# Patient Record
Sex: FEMALE | Race: BLACK OR AFRICAN AMERICAN | NOT HISPANIC OR LATINO | URBAN - METROPOLITAN AREA
[De-identification: names, ages, dates, MRNs, and addresses within clinical notes are randomized per-mention and may not be internally consistent; named-entity substitution may affect disease eponyms.]

---

## 2019-03-22 ENCOUNTER — INPATIENT (INPATIENT)
Facility: HOSPITAL | Age: 69
LOS: 0 days | Discharge: HOME | DRG: 311 | End: 2019-03-23
Attending: INTERNAL MEDICINE | Admitting: INTERNAL MEDICINE
Payer: MEDICAID

## 2019-03-22 VITALS
TEMPERATURE: 98 F | SYSTOLIC BLOOD PRESSURE: 163 MMHG | DIASTOLIC BLOOD PRESSURE: 91 MMHG | RESPIRATION RATE: 18 BRPM | HEART RATE: 72 BPM | WEIGHT: 179.9 LBS | HEIGHT: 66 IN | OXYGEN SATURATION: 96 %

## 2019-03-22 DIAGNOSIS — I20.0 UNSTABLE ANGINA: ICD-10-CM

## 2019-03-22 LAB
ALBUMIN SERPL ELPH-MCNC: 4.3 G/DL — SIGNIFICANT CHANGE UP (ref 3.3–5)
ALP SERPL-CCNC: 126 U/L — HIGH (ref 40–120)
ALT FLD-CCNC: 37 U/L — SIGNIFICANT CHANGE UP (ref 10–45)
ANION GAP SERPL CALC-SCNC: 13 MMOL/L — SIGNIFICANT CHANGE UP (ref 5–17)
APTT BLD: 30.3 SEC — SIGNIFICANT CHANGE UP (ref 27.5–36.3)
APTT BLD: 71.4 SEC — HIGH (ref 27.5–36.3)
AST SERPL-CCNC: 23 U/L — SIGNIFICANT CHANGE UP (ref 10–40)
BASOPHILS # BLD AUTO: 0 K/UL — SIGNIFICANT CHANGE UP (ref 0–0.2)
BASOPHILS NFR BLD AUTO: 0.1 % — SIGNIFICANT CHANGE UP (ref 0–2)
BILIRUB SERPL-MCNC: 0.2 MG/DL — SIGNIFICANT CHANGE UP (ref 0.2–1.2)
BUN SERPL-MCNC: 18 MG/DL — SIGNIFICANT CHANGE UP (ref 7–23)
CALCIUM SERPL-MCNC: 9.9 MG/DL — SIGNIFICANT CHANGE UP (ref 8.4–10.5)
CHLORIDE SERPL-SCNC: 101 MMOL/L — SIGNIFICANT CHANGE UP (ref 96–108)
CK MB BLD-MCNC: 3.4 % — SIGNIFICANT CHANGE UP (ref 0–3.5)
CK MB CFR SERPL CALC: 10.4 NG/ML — HIGH (ref 0–3.8)
CK SERPL-CCNC: 302 U/L — HIGH (ref 25–170)
CO2 SERPL-SCNC: 25 MMOL/L — SIGNIFICANT CHANGE UP (ref 22–31)
CREAT SERPL-MCNC: 0.85 MG/DL — SIGNIFICANT CHANGE UP (ref 0.5–1.3)
EOSINOPHIL # BLD AUTO: 0.2 K/UL — SIGNIFICANT CHANGE UP (ref 0–0.5)
EOSINOPHIL NFR BLD AUTO: 1.4 % — SIGNIFICANT CHANGE UP (ref 0–6)
GAS PNL BLDV: SIGNIFICANT CHANGE UP
GLUCOSE SERPL-MCNC: 124 MG/DL — HIGH (ref 70–99)
HCT VFR BLD CALC: 34.7 % — SIGNIFICANT CHANGE UP (ref 34.5–45)
HCT VFR BLD CALC: 35.8 % — SIGNIFICANT CHANGE UP (ref 34.5–45)
HGB BLD-MCNC: 11.5 G/DL — SIGNIFICANT CHANGE UP (ref 11.5–15.5)
HGB BLD-MCNC: 12 G/DL — SIGNIFICANT CHANGE UP (ref 11.5–15.5)
INR BLD: 1.02 RATIO — SIGNIFICANT CHANGE UP (ref 0.88–1.16)
LYMPHOCYTES # BLD AUTO: 2.6 K/UL — SIGNIFICANT CHANGE UP (ref 1–3.3)
LYMPHOCYTES # BLD AUTO: 23.3 % — SIGNIFICANT CHANGE UP (ref 13–44)
MAGNESIUM SERPL-MCNC: 2.3 MG/DL — SIGNIFICANT CHANGE UP (ref 1.6–2.6)
MCHC RBC-ENTMCNC: 26.9 PG — LOW (ref 27–34)
MCHC RBC-ENTMCNC: 27.5 PG — SIGNIFICANT CHANGE UP (ref 27–34)
MCHC RBC-ENTMCNC: 33 GM/DL — SIGNIFICANT CHANGE UP (ref 32–36)
MCHC RBC-ENTMCNC: 33.6 GM/DL — SIGNIFICANT CHANGE UP (ref 32–36)
MCV RBC AUTO: 81.4 FL — SIGNIFICANT CHANGE UP (ref 80–100)
MCV RBC AUTO: 81.9 FL — SIGNIFICANT CHANGE UP (ref 80–100)
MONOCYTES # BLD AUTO: 0.6 K/UL — SIGNIFICANT CHANGE UP (ref 0–0.9)
MONOCYTES NFR BLD AUTO: 4.9 % — SIGNIFICANT CHANGE UP (ref 2–14)
NEUTROPHILS # BLD AUTO: 7.9 K/UL — HIGH (ref 1.8–7.4)
NEUTROPHILS NFR BLD AUTO: 70.3 % — SIGNIFICANT CHANGE UP (ref 43–77)
NT-PROBNP SERPL-SCNC: 269 PG/ML — SIGNIFICANT CHANGE UP (ref 0–300)
PLATELET # BLD AUTO: 207 K/UL — SIGNIFICANT CHANGE UP (ref 150–400)
PLATELET # BLD AUTO: 222 K/UL — SIGNIFICANT CHANGE UP (ref 150–400)
POTASSIUM SERPL-MCNC: 4.2 MMOL/L — SIGNIFICANT CHANGE UP (ref 3.5–5.3)
POTASSIUM SERPL-SCNC: 4.2 MMOL/L — SIGNIFICANT CHANGE UP (ref 3.5–5.3)
PROT SERPL-MCNC: 8.1 G/DL — SIGNIFICANT CHANGE UP (ref 6–8.3)
PROTHROM AB SERPL-ACNC: 11.7 SEC — SIGNIFICANT CHANGE UP (ref 10–12.9)
RBC # BLD: 4.26 M/UL — SIGNIFICANT CHANGE UP (ref 3.8–5.2)
RBC # BLD: 4.37 M/UL — SIGNIFICANT CHANGE UP (ref 3.8–5.2)
RBC # FLD: 13.2 % — SIGNIFICANT CHANGE UP (ref 10.3–14.5)
RBC # FLD: 13.5 % — SIGNIFICANT CHANGE UP (ref 10.3–14.5)
SODIUM SERPL-SCNC: 139 MMOL/L — SIGNIFICANT CHANGE UP (ref 135–145)
TROPONIN T, HIGH SENSITIVITY RESULT: 29 NG/L — SIGNIFICANT CHANGE UP (ref 0–51)
WBC # BLD: 10.7 K/UL — HIGH (ref 3.8–10.5)
WBC # BLD: 11.3 K/UL — HIGH (ref 3.8–10.5)
WBC # FLD AUTO: 10.7 K/UL — HIGH (ref 3.8–10.5)
WBC # FLD AUTO: 11.3 K/UL — HIGH (ref 3.8–10.5)

## 2019-03-22 PROCEDURE — 78452 HT MUSCLE IMAGE SPECT MULT: CPT | Mod: 26

## 2019-03-22 PROCEDURE — 71045 X-RAY EXAM CHEST 1 VIEW: CPT | Mod: 26

## 2019-03-22 PROCEDURE — 93018 CV STRESS TEST I&R ONLY: CPT

## 2019-03-22 PROCEDURE — 99255 IP/OBS CONSLTJ NEW/EST HI 80: CPT | Mod: GC

## 2019-03-22 PROCEDURE — 99285 EMERGENCY DEPT VISIT HI MDM: CPT | Mod: 25

## 2019-03-22 PROCEDURE — 93010 ELECTROCARDIOGRAM REPORT: CPT

## 2019-03-22 PROCEDURE — 93016 CV STRESS TEST SUPVJ ONLY: CPT

## 2019-03-22 RX ORDER — ATENOLOL 25 MG/1
50 TABLET ORAL DAILY
Qty: 0 | Refills: 0 | Status: DISCONTINUED | OUTPATIENT
Start: 2019-03-22 | End: 2019-03-23

## 2019-03-22 RX ORDER — LISINOPRIL 2.5 MG/1
5 TABLET ORAL ONCE
Qty: 0 | Refills: 0 | Status: DISCONTINUED | OUTPATIENT
Start: 2019-03-22 | End: 2019-03-22

## 2019-03-22 RX ORDER — ATENOLOL 25 MG/1
0.5 TABLET ORAL
Qty: 0 | Refills: 0 | COMMUNITY

## 2019-03-22 RX ORDER — LISINOPRIL 2.5 MG/1
10 TABLET ORAL ONCE
Qty: 0 | Refills: 0 | Status: COMPLETED | OUTPATIENT
Start: 2019-03-22 | End: 2019-03-22

## 2019-03-22 RX ORDER — ASPIRIN/CALCIUM CARB/MAGNESIUM 324 MG
1 TABLET ORAL
Qty: 0 | Refills: 0 | COMMUNITY

## 2019-03-22 RX ORDER — FUROSEMIDE 40 MG
1 TABLET ORAL
Qty: 0 | Refills: 0 | COMMUNITY

## 2019-03-22 RX ORDER — FUROSEMIDE 40 MG
40 TABLET ORAL
Qty: 0 | Refills: 0 | Status: DISCONTINUED | OUTPATIENT
Start: 2019-03-22 | End: 2019-03-23

## 2019-03-22 RX ORDER — GABAPENTIN 400 MG/1
300 CAPSULE ORAL
Qty: 0 | Refills: 0 | Status: DISCONTINUED | OUTPATIENT
Start: 2019-03-22 | End: 2019-03-23

## 2019-03-22 RX ORDER — GABAPENTIN 400 MG/1
1 CAPSULE ORAL
Qty: 0 | Refills: 0 | COMMUNITY

## 2019-03-22 RX ORDER — HEPARIN SODIUM 5000 [USP'U]/ML
5000 INJECTION INTRAVENOUS; SUBCUTANEOUS EVERY 6 HOURS
Qty: 0 | Refills: 0 | Status: DISCONTINUED | OUTPATIENT
Start: 2019-03-22 | End: 2019-03-22

## 2019-03-22 RX ORDER — LOSARTAN/HYDROCHLOROTHIAZIDE 100MG-25MG
1 TABLET ORAL
Qty: 0 | Refills: 0 | COMMUNITY

## 2019-03-22 RX ORDER — ASPIRIN/CALCIUM CARB/MAGNESIUM 324 MG
81 TABLET ORAL DAILY
Qty: 0 | Refills: 0 | Status: DISCONTINUED | OUTPATIENT
Start: 2019-03-22 | End: 2019-03-23

## 2019-03-22 RX ORDER — HEPARIN SODIUM 5000 [USP'U]/ML
INJECTION INTRAVENOUS; SUBCUTANEOUS
Qty: 25000 | Refills: 0 | Status: DISCONTINUED | OUTPATIENT
Start: 2019-03-22 | End: 2019-03-22

## 2019-03-22 RX ADMIN — Medication 40 MILLIGRAM(S): at 19:05

## 2019-03-22 RX ADMIN — GABAPENTIN 300 MILLIGRAM(S): 400 CAPSULE ORAL at 19:05

## 2019-03-22 RX ADMIN — LISINOPRIL 10 MILLIGRAM(S): 2.5 TABLET ORAL at 10:39

## 2019-03-22 RX ADMIN — HEPARIN SODIUM 1000 UNIT(S)/HR: 5000 INJECTION INTRAVENOUS; SUBCUTANEOUS at 09:56

## 2019-03-22 NOTE — CONSULT NOTE ADULT - SUBJECTIVE AND OBJECTIVE BOX
Patient seen and evaluated at bedside    Chief Complaint: chest pain    HPI:      PMHx:   HTN  HLD      PSHx:   No significant past surgical history      Allergies:  penicillin (Hives; Rash)      Home Meds:    Current Medications:   heparin  Infusion.  Unit(s)/Hr IV Continuous <Continuous>  heparin  Injectable 5000 Unit(s) IV Push every 6 hours PRN      FAMILY HISTORY:      Social History:  Smoking History: denies  Alcohol Use: denies  Drug Use: denies    REVIEW OF SYSTEMS:  CONSTITUTIONAL: No weakness, fevers or chills  EYES/ENT: No visual changes;  No dysphagia  NECK: No pain or stiffness  RESPIRATORY: No cough, wheezing, hemoptysis; No shortness of breath  CARDIOVASCULAR: No chest pain or palpitations; No lower extremity edema  GASTROINTESTINAL: No abdominal or epigastric pain. No nausea, vomiting, or hematemesis; No diarrhea or constipation. No melena or hematochezia.  BACK: No back pain  GENITOURINARY: No dysuria, frequency or hematuria  NEUROLOGICAL: No numbness or weakness  SKIN: No itching, burning, rashes, or lesions   All other review of systems is negative unless indicated above.    Physical Exam:  T(F): 97.8 (03-22), Max: 97.8 (03-22)  HR: 72 (03-22) (72 - 72)  BP: 163/91 (03-22) (163/91 - 163/91)  RR: 18 (03-22)  SpO2: 96% (03-22)  GENERAL: No acute distress, well-developed  HEAD:  Atraumatic, Normocephalic  ENT: EOMI, PERRLA, conjunctiva and sclera clear, Neck supple, No JVD, moist mucosa  CHEST/LUNG: Clear to auscultation bilaterally; No wheeze, equal breath sounds bilaterally   BACK: No spinal tenderness  HEART: Regular rate and rhythm; No murmurs, rubs, or gallops  ABDOMEN: Soft, Nontender, Nondistended; Bowel sounds present  EXTREMITIES:  No clubbing, cyanosis, or edema  PSYCH: Nl behavior, nl affect  NEUROLOGY: AAOx3, non-focal, cranial nerves intact  SKIN: Normal color, No rashes or lesions      Cardiovascular Diagnostic Testing:    ECG: Personally reviewed:    Echo: Personally reviewed:    Stress Testing:    Cath:    Imaging:    CXR: Personally reviewed    Labs: Personally reviewed                        12.0   11.3  )-----------( 222      ( 22 Mar 2019 09:18 )             35.8       PT/INR - ( 22 Mar 2019 09:18 )   PT: 11.7 sec;   INR: 1.02 ratio     PTT - ( 22 Mar 2019 09:18 )  PTT:71.4 sec Patient seen and evaluated at bedside    Chief Complaint: chest pain    HPI:  Ms Madrid is a 69 yo F with a PMH significant for HTN, HLD and sciatica who was transferred from OSH for further management of chest pain. About two days ago, patient went to bed and felt some chest tightness. She thought it as due to "asthma" though she does not have a diagnosis. The pain was substernal, nonexertional, not relieved with rest and did not bother her a lot so she fell asleep. When she woke up, she asked her daughter if she could hear wheezing because she felt the chest tightness again. At that point, she went to OSH ED. On arrival there, BPs 180s/100s. EKG normal, troponin 0.010 -> 0.012. CTA was negative for PE. She was loaded with ASA, Brilinta and placed on hep gtt for unstable angina.    On arrival here, /91, P 72, RR 18, O2 sat 96 on RA. EKG unchanged, NSR with PACs. She endorses chronic left leg edema but denies any chest discomfort, palpitations, orthopnea, PND, SOB.      PMHx:   HTN  HLD      PSHx:       Allergies:  penicillin (Hives; Rash)      Home Meds:  "blood pressure pill and cholesterol pill"    Current Medications:   heparin  Infusion.  Unit(s)/Hr IV Continuous <Continuous>  heparin  Injectable 5000 Unit(s) IV Push every 6 hours PRN      Social History:  Smoking History: denies  Alcohol Use: denies  Drug Use: denies    REVIEW OF SYSTEMS:  CONSTITUTIONAL: No weakness, fevers or chills  EYES/ENT: No visual changes;  No dysphagia  NECK: No pain or stiffness  RESPIRATORY: No cough, wheezing, hemoptysis; No shortness of breath  CARDIOVASCULAR: + chest discomfort, + LE edema, No palpitations  GASTROINTESTINAL: No abdominal or epigastric pain. No nausea, vomiting, or hematemesis; No diarrhea or constipation. No melena or hematochezia.  GENITOURINARY: No dysuria, frequency or hematuria  NEUROLOGICAL: No numbness or weakness  SKIN: No itching, burning, rashes, or lesions   All other review of systems is negative unless indicated above.    Physical Exam:  T(F): 97.8 (-), Max: 97.8 (-)  HR: 72 (-) (72 - 72)  BP: 163/91 (-) (163/91 - 163/91)  RR: 18 (-)  SpO2: 96% (-)  GENERAL: No acute distress, well-developed  HEAD:  Atraumatic, Normocephalic  ENT: EOMI, PERRLA, conjunctiva and sclera clear, Neck supple, No JVD, moist mucosa  CHEST/LUNG: Clear to auscultation bilaterally; No wheeze, equal breath sounds bilaterally   BACK: No spinal tenderness  HEART: Regular rate and rhythm; No murmurs, rubs, or gallops  ABDOMEN: Soft, Nontender, Nondistended; Bowel sounds present  EXTREMITIES:  trace edema  PSYCH: Nl behavior, nl affect  NEUROLOGY: AAOx3, non-focal, cranial nerves intact  SKIN: Normal color, No rashes or lesions      Cardiovascular Diagnostic Testing:    ECG: Personally reviewed: NSR with PACs    Imaging:    CXR: Personally reviewed    Labs: Personally reviewed                        12.0   11.3  )-----------( 222      ( 22 Mar 2019 09:18 )             35.8       PT/INR - ( 22 Mar 2019 09:18 )   PT: 11.7 sec;   INR: 1.02 ratio     PTT - ( 22 Mar 2019 09:18 )  PTT:71.4 sec

## 2019-03-22 NOTE — ED PROVIDER NOTE - PHYSICAL EXAMINATION
*GEN: NAD; well appearing; A+O x3   *HEAD: NC/AT   *EYES/NOSE: PERRL & EOMI b/l  *THROAT: airway patent, moist mucous membranes  *NECK: Neck supple, no masses  *PULMONARY: CTA b/l, symmetric breath sounds.   *CARDIAC: s1s2, regular rhythm, no Murmur  *ABDOMEN:  ND, NT, soft, no guarding, no rebound, no masses   *BACK: no CVA tenderness, Normal  spine   *EXTREMITIES: symmetric pulses, 2+ dp & radial pulses, capillary refill < 2 seconds, no cyanosis, lle pitting edema  *SKIN: no rash or bruising   *NEUROLOGIC: alert, CN 2-12 intact, moves all 4 extremities, full active & passive ROM in all extremities, normal baseline gait  *PSYCH: insight and judgment nl, memory nl, affect nl, thought nl

## 2019-03-22 NOTE — ED ADULT NURSE NOTE - OBJECTIVE STATEMENT
68YOF 68YOF pmh HTN, HLD tx from [facility] c/o cp/ Neg trops on arrival to [facility] but per EMS trop redrawn and  changed at 4:30 to 0.12. Pt given bolus heparin 5000 units and started on heparin drip 6.5ml/hr. Pt weighted on arrival to [facility] ED and coag drawn and sent to lab. Pt denies SOB, fever, chills, HA, dizziness, N/V/D, abd pain, burning upon urination.  No signs of bleeding noted. Pt placed on cardiac monitor. NSR noted. EKG done.  Safety and comfort measures provided. Will continue to monitor.

## 2019-03-22 NOTE — ED PROVIDER NOTE - NS ED ROS FT
Review of Systems:  	•	CONSTITUTIONAL: no fever  	•	SKIN: no rash  	•	RESPIRATORY: HWANG  	•	CARDIAC: + chest pain, no palpitations  	•	GI:  no abd pain, no nausea, no vomiting, no diarrhea  	•	GENITO-URINARY:  no dysuria; no hematuria    	•	MUSCULOSKELETAL:  no back pain  	•	NEUROLOGIC: no weakness  	•	ALLERGY: no rhinitis  	•	PSYSCHIATRIC: no anxiety

## 2019-03-22 NOTE — H&P ADULT - HISTORY OF PRESENT ILLNESS
69 yo F with a PMH significant for HTN, HLD and sciatica who was transferred from OSH for further management of chest pain. About two days ago, patient went to bed and felt some chest tightness. She thought it as due to "asthma" though she does not have a diagnosis. The pain was substernal, nonexertional, not relieved with rest and did not bother her a lot so she fell asleep. When she woke up, she asked her daughter if she could hear wheezing because she felt the chest tightness again. At that point, she went to OSH ED. On arrival there, BPs 180s/100s. EKG normal, troponin 0.010 -> 0.012. CTA was negative for PE. She was loaded with ASA, Brilinta and placed on hep gtt for unstable angina

## 2019-03-22 NOTE — CONSULT NOTE ADULT - ASSESSMENT
Ms Madrid is a 67 yo F with a PMH significant for HTN, HLD and sciatica who was transferred from OSH for further management of chest pain in the setting of HTN.    #HTN urgency  BPs at OSH 180s/100s. Currently 160s/90s. Chest pain free. Trops neg x3.  - patient does not remember home medications, try to obtain records, if unable to, start lisinopril 10mg, uptitrate if BPs persistently > 150  - d/c hep gtt, Brilinta  - pharm nuclear stress test  - TTE  - lipid panel, A1C    #HLD  - pending A1C and lipid panel    Janes Wright MD  Cardiology Fellow  [phone]

## 2019-03-22 NOTE — ED ADULT NURSE REASSESSMENT NOTE - NS ED NURSE REASSESS COMMENT FT1
pt back from stress test. In no acute distress. Labs drawn and sent to lab.  Will continue to monitor.

## 2019-03-22 NOTE — H&P ADULT - ASSESSMENT
pt with above history p/w chest pain       - evaluated by cards - poss stress test      htn - cont bb + arb -     Hld - pt not on statin - will check lipid profile and start meds as per     pt on lasix - unclear ef - elsie obtain echo     Pt on Neurontin - unclear about diagnosis of neuropathy - pt not sre about the diagnosis -will cont    dvt prophylaxis

## 2019-03-22 NOTE — ED PROVIDER NOTE - OBJECTIVE STATEMENT
Pertinent PMH/PSH/FHx/SHx and Review of Systems contained within  68yoF PMH HTN, HLD transferred from [facility] for trop change from .010 to .012 in setting of active cp. pt had ct a which showed no pe, but showed small pericardial effusion. given asa 324, brilinta & started on heparin gtt. over past 2-3d has had desir & decreased exercise tolerance. yesterday had cp at rest so went to OSH.   ROS positive for chronic LLE edema  ROS negative for: fever, current Chest pain, current SOB, Nausea, vomiting, diarrhea, abdominal pain, dysuria    FamilyHx and SocialHx not otherwise contributory

## 2019-03-22 NOTE — ED PROVIDER NOTE - ATTENDING CONTRIBUTION TO CARE
68yoF PMH HTN, HLD transferred from [facility] for trop change from .010 to .012 in setting of active cp.  ekg no changes, seen by cards for tele admission, stress test, hold heparin, low risk not nstemi.

## 2019-03-23 VITALS
DIASTOLIC BLOOD PRESSURE: 75 MMHG | RESPIRATION RATE: 18 BRPM | TEMPERATURE: 98 F | HEART RATE: 55 BPM | OXYGEN SATURATION: 98 % | SYSTOLIC BLOOD PRESSURE: 136 MMHG

## 2019-03-23 LAB
ANION GAP SERPL CALC-SCNC: 11 MMOL/L — SIGNIFICANT CHANGE UP (ref 5–17)
BUN SERPL-MCNC: 19 MG/DL — SIGNIFICANT CHANGE UP (ref 7–23)
CALCIUM SERPL-MCNC: 9.8 MG/DL — SIGNIFICANT CHANGE UP (ref 8.4–10.5)
CHLORIDE SERPL-SCNC: 102 MMOL/L — SIGNIFICANT CHANGE UP (ref 96–108)
CO2 SERPL-SCNC: 26 MMOL/L — SIGNIFICANT CHANGE UP (ref 22–31)
CREAT SERPL-MCNC: 0.88 MG/DL — SIGNIFICANT CHANGE UP (ref 0.5–1.3)
GLUCOSE SERPL-MCNC: 99 MG/DL — SIGNIFICANT CHANGE UP (ref 70–99)
HCT VFR BLD CALC: 33.7 % — LOW (ref 34.5–45)
HGB BLD-MCNC: 10.9 G/DL — LOW (ref 11.5–15.5)
MAGNESIUM SERPL-MCNC: 2.2 MG/DL — SIGNIFICANT CHANGE UP (ref 1.6–2.6)
MCHC RBC-ENTMCNC: 25.8 PG — LOW (ref 27–34)
MCHC RBC-ENTMCNC: 32.3 GM/DL — SIGNIFICANT CHANGE UP (ref 32–36)
MCV RBC AUTO: 79.7 FL — LOW (ref 80–100)
PLATELET # BLD AUTO: 225 K/UL — SIGNIFICANT CHANGE UP (ref 150–400)
POTASSIUM SERPL-MCNC: 3.7 MMOL/L — SIGNIFICANT CHANGE UP (ref 3.5–5.3)
POTASSIUM SERPL-SCNC: 3.7 MMOL/L — SIGNIFICANT CHANGE UP (ref 3.5–5.3)
RBC # BLD: 4.23 M/UL — SIGNIFICANT CHANGE UP (ref 3.8–5.2)
RBC # FLD: 14.5 % — SIGNIFICANT CHANGE UP (ref 10.3–14.5)
SODIUM SERPL-SCNC: 139 MMOL/L — SIGNIFICANT CHANGE UP (ref 135–145)
WBC # BLD: 9.03 K/UL — SIGNIFICANT CHANGE UP (ref 3.8–10.5)
WBC # FLD AUTO: 9.03 K/UL — SIGNIFICANT CHANGE UP (ref 3.8–10.5)

## 2019-03-23 PROCEDURE — 82947 ASSAY GLUCOSE BLOOD QUANT: CPT

## 2019-03-23 PROCEDURE — 83605 ASSAY OF LACTIC ACID: CPT

## 2019-03-23 PROCEDURE — 93017 CV STRESS TEST TRACING ONLY: CPT

## 2019-03-23 PROCEDURE — 82553 CREATINE MB FRACTION: CPT

## 2019-03-23 PROCEDURE — A9500: CPT

## 2019-03-23 PROCEDURE — 71045 X-RAY EXAM CHEST 1 VIEW: CPT

## 2019-03-23 PROCEDURE — 85610 PROTHROMBIN TIME: CPT

## 2019-03-23 PROCEDURE — 84132 ASSAY OF SERUM POTASSIUM: CPT

## 2019-03-23 PROCEDURE — 85730 THROMBOPLASTIN TIME PARTIAL: CPT

## 2019-03-23 PROCEDURE — 99285 EMERGENCY DEPT VISIT HI MDM: CPT | Mod: 25

## 2019-03-23 PROCEDURE — 93005 ELECTROCARDIOGRAM TRACING: CPT

## 2019-03-23 PROCEDURE — 80053 COMPREHEN METABOLIC PANEL: CPT

## 2019-03-23 PROCEDURE — 83880 ASSAY OF NATRIURETIC PEPTIDE: CPT

## 2019-03-23 PROCEDURE — 84295 ASSAY OF SERUM SODIUM: CPT

## 2019-03-23 PROCEDURE — 85027 COMPLETE CBC AUTOMATED: CPT

## 2019-03-23 PROCEDURE — 82550 ASSAY OF CK (CPK): CPT

## 2019-03-23 PROCEDURE — 80048 BASIC METABOLIC PNL TOTAL CA: CPT

## 2019-03-23 PROCEDURE — 83735 ASSAY OF MAGNESIUM: CPT

## 2019-03-23 PROCEDURE — 84484 ASSAY OF TROPONIN QUANT: CPT

## 2019-03-23 PROCEDURE — 82435 ASSAY OF BLOOD CHLORIDE: CPT

## 2019-03-23 PROCEDURE — 78452 HT MUSCLE IMAGE SPECT MULT: CPT

## 2019-03-23 PROCEDURE — 99232 SBSQ HOSP IP/OBS MODERATE 35: CPT | Mod: GC

## 2019-03-23 PROCEDURE — 85014 HEMATOCRIT: CPT

## 2019-03-23 PROCEDURE — 82330 ASSAY OF CALCIUM: CPT

## 2019-03-23 PROCEDURE — 82803 BLOOD GASES ANY COMBINATION: CPT

## 2019-03-23 PROCEDURE — 96374 THER/PROPH/DIAG INJ IV PUSH: CPT

## 2019-03-23 RX ADMIN — GABAPENTIN 300 MILLIGRAM(S): 400 CAPSULE ORAL at 05:40

## 2019-03-23 RX ADMIN — Medication 81 MILLIGRAM(S): at 11:34

## 2019-03-23 RX ADMIN — ATENOLOL 50 MILLIGRAM(S): 25 TABLET ORAL at 05:40

## 2019-03-23 RX ADMIN — Medication 40 MILLIGRAM(S): at 05:40

## 2019-03-23 NOTE — PROGRESS NOTE ADULT - ASSESSMENT
Ms Madrid is a 67 yo F with a PMH significant for HTN, HLD and sciatica who was transferred from OSH for further management of chest pain in the setting of HTN.    #HTN urgency - BP's acceptable now  - BP regimen as per medicine  - nuclear stress normal    No objections to DC.    ANITA Nance MD  Cardiology Fellow  [phone]

## 2019-03-23 NOTE — DISCHARGE NOTE PROVIDER - NSDCCPCAREPLAN_GEN_ALL_CORE_FT
PRINCIPAL DISCHARGE DIAGNOSIS  Diagnosis: Unstable angina  Assessment and Plan of Treatment: normal nuclear stress test. EKG normal. negative cardiac enzymes.  negative PE.   HOME CARE INSTRUCTIONS  For the next few days, avoid physical activities that bring on chest pain. Continue physical activities as directed.  Do not smoke.  Avoid drinking alcohol.   Only take over-the-counter or prescription medicine for pain, discomfort, or fever as directed by your caregiver.  Follow your caregiver's suggestions for further testing if your chest pain does not go away.  Keep any follow-up appointments you made. If you do not go to an appointment, you could develop lasting (chronic) problems with pain. If there is any problem keeping an appointment, you must call to reschedule.   SEEK MEDICAL CARE IF:  You think you are having problems from the medicine you are taking. Read your medicine instructions carefully.  Your chest pain does not go away, even after treatment.  You develop a rash with blisters on your chest.  SEEK IMMEDIATE MEDICAL CARE IF:  You have increased chest pain or pain that spreads to your arm, neck, jaw, back, or abdomen.   You develop shortness of breath, an increasing cough, or you are coughing up blood.  You have severe back or abdominal pain, feel nauseous, or vomit.  You develop severe weakness, fainting, or chills.  You have a fever.        SECONDARY DISCHARGE DIAGNOSES  Diagnosis: Neuropathy  Assessment and Plan of Treatment: continue with gabapentin    Diagnosis: Hypertensive urgency  Assessment and Plan of Treatment: Low salt diet  Activity as tolerated.  Take all medication as prescribed.  Follow up with your medical doctor for routine blood pressure monitoring at your next visit.  Notify your doctor if you have any of the following symptoms:   Dizziness, Lightheadedness, Blurry vision, Headache, Chest pain, Shortness of breath

## 2019-03-23 NOTE — DISCHARGE NOTE NURSING/CASE MANAGEMENT/SOCIAL WORK - NSDCDPATPORTLINK_GEN_ALL_CORE
You can access the [facility] Patient Portal, offered by [facility], by registering with the following website: [email or web address]

## 2019-03-23 NOTE — DISCHARGE NOTE PROVIDER - NSDCFUADDAPPT_GEN_ALL_CORE_FT
please follow up with your PMD in 1 week. please follow up with your PMD in 1 week. you need echocardiogram.

## 2019-03-23 NOTE — DISCHARGE NOTE PROVIDER - HOSPITAL COURSE
67 yo F with a PMH significant for HTN, HLD and sciatica who was transferred from OSH for further management of chest pain. found hypertensive urgency,  BPs 180s/100s. now stable. EKG normal, troponin 0.010 -> 0.012. CTA was negative for PE. She was loaded with ASA, Brilinta and placed on hep gtt for unstable angina. s/p NST, normal study. pt is stable to d/c home

## 2019-03-23 NOTE — PROGRESS NOTE ADULT - SUBJECTIVE AND OBJECTIVE BOX
Interval History: Denies any chest pain, palpitations, dyspnea overnight.     Review Of Systems:  Constitutional: [ ] Fever [ ] Chills [ ] Fatigue [ ] Weight change   HEENT: [ ] Blurred vision [ ] Eye Pain [ ] Headache [ ] Runny nose [ ] Sore Throat   Respiratory: [ ] Cough [ ] Wheezing [ ] Shortness of breath  Cardiovascular: [ ] Chest Pain [ ] Palpitations [ ] HWANG [ ] PND [ ] Orthopnea  Gastrointestinal: [ ] Abdominal Pain [ ] Diarrhea [ ] Constipation [ ] Hemorrhoids [ ] Nausea [ ] Vomiting  Genitourinary: [ ] Nocturia [ ] Dysuria [ ] Incontinence  Extremities: [ ] Swelling [ ] Joint Pain  Neurologic: [ ] Focal deficit [ ] Paresthesias [ ] Syncope  Lymphatic: [ ] Swelling [ ] Lymphadenopathy   Skin: [ ] Rash [ ] Ecchymoses [ ] Wounds [ ] Lesions  Psychiatry: [ ] Depression [ ] Suicidal/Homicidal Ideation [ ] Anxiety [ ] Sleep Disturbances  [ x] 10 point review of systems is otherwise negative except as mentioned above            [ ]Unable to obtain    Medications:  aspirin enteric coated 81 milliGRAM(s) Oral daily  ATENolol  Tablet 50 milliGRAM(s) Oral daily  furosemide    Tablet 40 milliGRAM(s) Oral two times a day  gabapentin 300 milliGRAM(s) Oral two times a day      Vitals:  ICU Vital Signs Last 24 Hrs  T(C): 36.7 (23 Mar 2019 04:25), Max: 36.9 (22 Mar 2019 17:45)  T(F): 98 (23 Mar 2019 04:25), Max: 98.5 (22 Mar 2019 17:45)  HR: 74 (23 Mar 2019 04:25) (55 - 76)  BP: 136/73 (23 Mar 2019 04:25) (109/51 - 165/85)  BP(mean): --  ABP: --  ABP(mean): --  RR: 18 (23 Mar 2019 04:25) (17 - 18)  SpO2: 99% (23 Mar 2019 04:25) (96% - 100%)    Daily     Daily   I&O's Summary      Physical Exam:  Appearance:  NAD [  ] Intubated [  ] Sedated  HENT: NC/AT  Cardiovascular: S1, S2, [  ] LE Edema Present, [  ] JVD Present  Respiratory: Clear to auscultation bilaterally,  [   ] Transmitted Breath Sounds From Vent/Trach  Gastrointestinal: Soft, Non-tender, Non-distended, BS+  Psychiatry: [x  ] AAOx3  [x   ] Follows Commands  [   ] Unable to assess  Skin: Intact,  [  ] Line Sites CDI, [  ] Wound sites CDI    Labs:                        10.9   9.03  )-----------( 225      ( 23 Mar 2019 09:50 )             33.7     03-23    139  |  102  |  19  ----------------------------<  99  3.7   |  26  |  0.88    Ca    9.8      23 Mar 2019 06:59  Mg     2.2     03-23    TPro  8.1  /  Alb  4.3  /  TBili  0.2  /  DBili  x   /  AST  23  /  ALT  37  /  AlkPhos  126<H>  03-22    PT/INR - ( 22 Mar 2019 09:18 )   PT: 11.7 sec;   INR: 1.02 ratio         PTT - ( 22 Mar 2019 18:08 )  PTT:30.3 sec  CARDIAC MARKERS ( 22 Mar 2019 09:18 )  x     / x     / 302 U/L / x     / 10.4 ng/mL      Serum Pro-Brain Natriuretic Peptide: 269 pg/mL (03-22 @ 09:18)    Interpretation of Telemetry: SR 50-90s

## 2019-03-23 NOTE — DISCHARGE NOTE PROVIDER - PROVIDER TOKENS
FREE:[LAST:[your PCP],PHONE:[(   )    -],FAX:[(   )    -],ADDRESS:[your PCP in [place]],FOLLOWUP:[2 weeks]]

## 2023-06-27 NOTE — DISCHARGE NOTE PROVIDER - NSDCHC_MEDRECSTATUS_GEN_ALL_CORE
DANA Vilalr DANA Villar Admission Reconciliation is Completed  Discharge Reconciliation is Completed ACP Maia Carlos Tele DANA Martínez DANA [place] Mauricio Livingston DANA Martínez

## 2024-12-02 NOTE — ED PROVIDER NOTE - PMH
<<----- Click to add NO pertinent Past Medical History No pertinent past medical history Pt owns all necessary DME
